# Patient Record
Sex: FEMALE | Race: WHITE | URBAN - METROPOLITAN AREA
[De-identification: names, ages, dates, MRNs, and addresses within clinical notes are randomized per-mention and may not be internally consistent; named-entity substitution may affect disease eponyms.]

---

## 2017-01-24 ENCOUNTER — IMPORTED ENCOUNTER (OUTPATIENT)
Dept: URBAN - METROPOLITAN AREA CLINIC 50 | Facility: CLINIC | Age: 72
End: 2017-01-24

## 2017-01-24 NOTE — PATIENT DISCUSSION
"""Informed patient that their cataract is visually significant and meets the criteria for cataract surgery to increase their vision and decrease their glare symptoms.  RBALs of surgery discussed

## 2021-04-17 ASSESSMENT — VISUAL ACUITY
OD_OTHER: 20/60.
OS_BAT: 20/50
OS_CC: 20/30
OS_OTHER: 20/50.
OD_BAT: 20/60
OD_CC: 20/40

## 2021-04-17 ASSESSMENT — TONOMETRY
OD_IOP_MMHG: 19
OS_IOP_MMHG: 19